# Patient Record
Sex: MALE | Race: BLACK OR AFRICAN AMERICAN | NOT HISPANIC OR LATINO | Employment: FULL TIME | URBAN - METROPOLITAN AREA
[De-identification: names, ages, dates, MRNs, and addresses within clinical notes are randomized per-mention and may not be internally consistent; named-entity substitution may affect disease eponyms.]

---

## 2022-01-06 ENCOUNTER — CLINICAL SUPPORT (OUTPATIENT)
Dept: HEMATOLOGY/ONCOLOGY | Facility: CLINIC | Age: 22
End: 2022-01-06

## 2022-01-06 DIAGNOSIS — J02.9 SORE THROAT: Primary | ICD-10-CM

## 2022-01-06 DIAGNOSIS — R09.81 NASAL CONGESTION: ICD-10-CM

## 2022-01-06 LAB
CTP QC/QA: YES
SARS-COV-2 AG RESP QL IA.RAPID: NEGATIVE

## 2022-01-06 PROCEDURE — 87811 SARS-COV-2 COVID19 W/OPTIC: CPT | Mod: S$GLB,,, | Performed by: INTERNAL MEDICINE

## 2022-01-06 PROCEDURE — 87811 SARS CORONAVIRUS 2 ANTIGEN POCT, MANUAL READ: ICD-10-PCS | Mod: S$GLB,,, | Performed by: INTERNAL MEDICINE

## 2022-10-25 ENCOUNTER — OFFICE VISIT (OUTPATIENT)
Dept: PRIMARY CARE CLINIC | Facility: CLINIC | Age: 22
End: 2022-10-25
Payer: OTHER GOVERNMENT

## 2022-10-25 VITALS
BODY MASS INDEX: 27.58 KG/M2 | HEART RATE: 66 BPM | SYSTOLIC BLOOD PRESSURE: 128 MMHG | HEIGHT: 68 IN | WEIGHT: 182 LBS | OXYGEN SATURATION: 98 % | DIASTOLIC BLOOD PRESSURE: 80 MMHG

## 2022-10-25 DIAGNOSIS — G47.00 INSOMNIA, UNSPECIFIED TYPE: Primary | ICD-10-CM

## 2022-10-25 PROCEDURE — 99203 OFFICE O/P NEW LOW 30 MIN: CPT | Mod: S$GLB,,, | Performed by: INTERNAL MEDICINE

## 2022-10-25 PROCEDURE — 99203 PR OFFICE/OUTPT VISIT, NEW, LEVL III, 30-44 MIN: ICD-10-PCS | Mod: S$GLB,,, | Performed by: INTERNAL MEDICINE

## 2022-10-25 RX ORDER — TRAZODONE HYDROCHLORIDE 300 MG/1
300 TABLET ORAL NIGHTLY
COMMUNITY
End: 2022-10-25 | Stop reason: SDUPTHER

## 2022-10-25 RX ORDER — TRAZODONE HYDROCHLORIDE 300 MG/1
300 TABLET ORAL NIGHTLY
Qty: 90 TABLET | Refills: 3 | Status: SHIPPED | OUTPATIENT
Start: 2022-10-25

## 2022-10-25 NOTE — PROGRESS NOTES
Subjective:      Patient ID: Woo Whitaker is a 22 y.o. male.    Chief Complaint: Establish Care    HPI      Past Medical History:   Diagnosis Date    Insomnia     PTSD (post-traumatic stress disorder)     Shoulder arthritis     left         History reviewed. No pertinent surgical history.     Family History   Problem Relation Age of Onset    Hyperlipidemia Mother     Stroke Mother     Hypertension Mother     No Known Problems Father         Social History     Socioeconomic History    Marital status: Single   Tobacco Use    Smoking status: Every Day     Types: Cigarettes, Vaping with nicotine     Passive exposure: Current    Smokeless tobacco: Never    Tobacco comments:     Vaping 6 mths   Substance and Sexual Activity    Alcohol use: Yes     Comment: every two days he drinks beer       Drinks about 2   16 oz beer, does get inebriated sometimes.     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   HENT:  Negative for hearing loss.    Eyes:  Negative for blurred vision.   Respiratory:  Negative for cough, shortness of breath and wheezing.    Cardiovascular:  Negative for chest pain, palpitations and leg swelling.   Gastrointestinal:  Negative for abdominal pain, constipation, diarrhea, nausea and vomiting.   Genitourinary:  Negative for dysuria, frequency and urgency.   Musculoskeletal:  Negative for falls.   Skin:  Negative for rash.   Neurological:  Negative for dizziness and headaches.   Psychiatric/Behavioral:  The patient has insomnia.    Objective:     Physical Exam  Vitals reviewed.   Constitutional:       Appearance: Normal appearance.   HENT:      Head: Normocephalic.   Eyes:      Extraocular Movements: Extraocular movements intact.      Conjunctiva/sclera: Conjunctivae normal.      Pupils: Pupils are equal, round, and reactive to  "light.   Cardiovascular:      Rate and Rhythm: Normal rate and regular rhythm.   Pulmonary:      Effort: Pulmonary effort is normal.      Breath sounds: Normal breath sounds.   Abdominal:      General: Bowel sounds are normal.   Musculoskeletal:         General: Normal range of motion.   Skin:     General: Skin is warm.      Capillary Refill: Capillary refill takes less than 2 seconds.   Neurological:      General: No focal deficit present.      Mental Status: He is alert and oriented to person, place, and time.   Psychiatric:         Mood and Affect: Mood normal.     /80 (BP Location: Left arm, Patient Position: Sitting, BP Method: Medium (Manual))   Pulse 66   Ht 5' 8" (1.727 m)   Wt 82.6 kg (182 lb)   SpO2 98%   BMI 27.67 kg/m²     Assessment:       ICD-10-CM ICD-9-CM   1. Insomnia, unspecified type  G47.00 780.52       Plan:     Medication List with Changes/Refills   Changed and/or Refilled Medications    Modified Medication Previous Medication    TRAZODONE (DESYREL) 300 MG TABLET traZODone (DESYREL) 300 MG tablet       Take 1 tablet (300 mg total) by mouth every evening. Took 1/2 tab at HS    Take 300 mg by mouth every evening. Took 1/2 tab at HS        Insomnia, unspecified type  -     traZODone (DESYREL) 300 MG tablet; Take 1 tablet (300 mg total) by mouth every evening. Took 1/2 tab at HS  Dispense: 90 tablet; Refill: 3     Assistance with smoking cessation was offered, including:  [x]  Medications  [x]  Counseling  []  Printed Information on Smoking Cessation  []  Referral to a Smoking Cessation Program    Patient was counseled regarding smoking for 3-10 minutes.       Patient does not smoke cigarettes but does vape which is not the best substitute               "

## 2022-12-08 ENCOUNTER — OFFICE VISIT (OUTPATIENT)
Dept: PRIMARY CARE CLINIC | Facility: CLINIC | Age: 22
End: 2022-12-08
Payer: OTHER GOVERNMENT

## 2022-12-08 ENCOUNTER — PATIENT MESSAGE (OUTPATIENT)
Dept: PRIMARY CARE CLINIC | Facility: CLINIC | Age: 22
End: 2022-12-08

## 2022-12-08 VITALS
BODY MASS INDEX: 27.43 KG/M2 | OXYGEN SATURATION: 98 % | DIASTOLIC BLOOD PRESSURE: 51 MMHG | SYSTOLIC BLOOD PRESSURE: 116 MMHG | WEIGHT: 181 LBS | HEIGHT: 68 IN | HEART RATE: 71 BPM

## 2022-12-08 DIAGNOSIS — R07.9 CHEST PAIN, UNSPECIFIED TYPE: Primary | ICD-10-CM

## 2022-12-08 PROCEDURE — 99214 PR OFFICE/OUTPT VISIT, EST, LEVL IV, 30-39 MIN: ICD-10-PCS | Mod: S$GLB,,, | Performed by: INTERNAL MEDICINE

## 2022-12-08 PROCEDURE — 99214 OFFICE O/P EST MOD 30 MIN: CPT | Mod: S$GLB,,, | Performed by: INTERNAL MEDICINE

## 2022-12-08 NOTE — PROGRESS NOTES
Subjective:      Patient ID: Woo Whitaker is a 22 y.o. male.    Chief Complaint: Chest Pain (Has been having it for a while and on last weekend, he was getting ready to throw a football he caught a sharp pain in the mid chest. He states it is normally locally on the left side under the nipple. He states it could be random actions that it occurs. He states that he has to hold his breath and breathe through the pain slowly. )      HPI    As above    Family History   Problem Relation Age of Onset    Hyperlipidemia Mother     Stroke Mother     Hypertension Mother     No Known Problems Father          Review of Systems   Constitutional:  Negative for chills, diaphoresis and fever.   Respiratory:  Negative for cough, shortness of breath and wheezing.    Cardiovascular:  Positive for chest pain. Negative for palpitations and leg swelling.   Gastrointestinal:  Negative for abdominal pain, constipation, diarrhea, nausea and vomiting.   Genitourinary:  Negative for dysuria, frequency and urgency.   Musculoskeletal:  Negative for falls and myalgias.   Skin:  Negative for rash.   Neurological:  Negative for dizziness and headaches.   Objective:     Physical Exam  Vitals reviewed.   Constitutional:       Appearance: Normal appearance.   HENT:      Head: Normocephalic.   Eyes:      Extraocular Movements: Extraocular movements intact.      Conjunctiva/sclera: Conjunctivae normal.      Pupils: Pupils are equal, round, and reactive to light.   Cardiovascular:      Rate and Rhythm: Normal rate and regular rhythm.   Pulmonary:      Effort: Pulmonary effort is normal.      Breath sounds: Normal breath sounds.   Abdominal:      General: Bowel sounds are normal.   Musculoskeletal:         General: Normal range of motion.   Skin:     General: Skin is warm.      Capillary Refill: Capillary refill takes less than 2 seconds.   Neurological:      General: No focal deficit present.      Mental Status: He is alert and oriented to person,  "place, and time.   Psychiatric:         Mood and Affect: Mood normal.     BP (!) 116/51 (BP Location: Right arm, Patient Position: Sitting, BP Method: Medium (Automatic))   Pulse 71   Ht 5' 8" (1.727 m)   Wt 82.1 kg (181 lb)   SpO2 98%   BMI 27.52 kg/m²     Assessment:       ICD-10-CM ICD-9-CM   1. Chest pain, unspecified type  R07.9 786.50       Plan:     Medication List with Changes/Refills   Current Medications    TRAZODONE (DESYREL) 300 MG TABLET    Take 1 tablet (300 mg total) by mouth every evening. Took 1/2 tab at HS        1. Chest pain, unspecified type  -     EKG 12-lead; Future  -     X-Ray Chest PA And Lateral; Future; Expected date: 12/08/2022       RTC PRN    Most likely precordial catch syndrome, advised to take small shallow breaths when experiencing pain, states pain is too short to take NSAIDs because by the time he thinks of taking one the pain is gone.                  "

## 2022-12-13 ENCOUNTER — PATIENT MESSAGE (OUTPATIENT)
Dept: PRIMARY CARE CLINIC | Facility: CLINIC | Age: 22
End: 2022-12-13
Payer: OTHER GOVERNMENT

## 2023-06-27 ENCOUNTER — PATIENT MESSAGE (OUTPATIENT)
Dept: RESEARCH | Facility: HOSPITAL | Age: 23
End: 2023-06-27
Payer: OTHER GOVERNMENT

## 2024-01-02 ENCOUNTER — OFFICE VISIT (OUTPATIENT)
Dept: PRIMARY CARE CLINIC | Facility: CLINIC | Age: 24
End: 2024-01-02
Payer: OTHER GOVERNMENT

## 2024-01-02 VITALS
SYSTOLIC BLOOD PRESSURE: 114 MMHG | OXYGEN SATURATION: 98 % | HEIGHT: 68 IN | BODY MASS INDEX: 29.4 KG/M2 | WEIGHT: 194 LBS | HEART RATE: 62 BPM | DIASTOLIC BLOOD PRESSURE: 70 MMHG

## 2024-01-02 DIAGNOSIS — M25.50 ARTHRALGIA, UNSPECIFIED JOINT: Primary | ICD-10-CM

## 2024-01-02 DIAGNOSIS — Z00.00 WELLNESS EXAMINATION: ICD-10-CM

## 2024-01-02 DIAGNOSIS — R07.2 PRECORDIAL CATCH SYNDROME: ICD-10-CM

## 2024-01-02 DIAGNOSIS — Z01.89 ROUTINE LAB DRAW: Primary | ICD-10-CM

## 2024-01-02 PROCEDURE — 36415 COLL VENOUS BLD VENIPUNCTURE: CPT | Mod: S$GLB,,, | Performed by: INTERNAL MEDICINE

## 2024-01-02 PROCEDURE — 99214 OFFICE O/P EST MOD 30 MIN: CPT | Mod: S$GLB,,, | Performed by: INTERNAL MEDICINE

## 2024-01-02 RX ORDER — IBUPROFEN 800 MG/1
800 TABLET ORAL EVERY 8 HOURS PRN
Qty: 60 TABLET | Refills: 0 | Status: SHIPPED | OUTPATIENT
Start: 2024-01-02 | End: 2024-02-01

## 2024-01-02 NOTE — PROGRESS NOTES
Answers submitted by the patient for this visit:  Review of Systems Questionnaire (Submitted on 1/2/2024)  activity change: No  unexpected weight change: No  neck pain: No  hearing loss: No  rhinorrhea: No  trouble swallowing: No  eye discharge: No  visual disturbance: No  chest tightness: Yes  wheezing: No  chest pain: Yes  palpitations: No  blood in stool: No  constipation: No  vomiting: No  diarrhea: No  polydipsia: No  polyuria: No  difficulty urinating: No  urgency: No  hematuria: No  joint swelling: No  arthralgias: Yes  headaches: No  weakness: No  confusion: No  dysphoric mood: No    Subjective:      Patient ID: Woo Whitaker is a 23 y.o. male.    Chief Complaint: Annual Exam and Joint Pain (The patient states he was previously on Ibuprofen 800 mg and would like a new script. He states at the time he came here he had refills and now without it. )    HPI    Past Medical History:   Diagnosis Date    Insomnia     PTSD (post-traumatic stress disorder)     Shoulder arthritis     left       Here for follow up, no acute complaints  1-2 beers three times a week, previous heavy drinker, quit vaping    Review of Systems   HENT:  Negative for hearing loss.    Eyes:  Negative for discharge.   Respiratory:  Negative for wheezing.    Cardiovascular:  Positive for chest pain. Negative for palpitations.        CXR and EKG negative  Does not radiate   Gastrointestinal:  Negative for blood in stool, constipation, diarrhea and vomiting.   Genitourinary:  Negative for hematuria and urgency.   Musculoskeletal:  Negative for neck pain.   Neurological:  Negative for weakness and headaches.   Endo/Heme/Allergies:  Negative for polydipsia.     Objective:     Physical Exam  Vitals reviewed.   Constitutional:       Appearance: Normal appearance.   HENT:      Head: Normocephalic.      Mouth/Throat:      Mouth: Mucous membranes are moist.      Pharynx: Oropharynx is clear.   Eyes:      Extraocular Movements: Extraocular movements  intact.      Conjunctiva/sclera: Conjunctivae normal.      Pupils: Pupils are equal, round, and reactive to light.   Cardiovascular:      Rate and Rhythm: Normal rate and regular rhythm.   Pulmonary:      Effort: Pulmonary effort is normal.      Breath sounds: Normal breath sounds.   Abdominal:      General: Bowel sounds are normal.   Musculoskeletal:      Right lower leg: No edema.      Left lower leg: No edema.   Skin:     General: Skin is warm.      Capillary Refill: Capillary refill takes less than 2 seconds.   Neurological:      Mental Status: He is alert and oriented to person, place, and time.   Psychiatric:         Mood and Affect: Mood normal.       Assessment:       ICD-10-CM ICD-9-CM   1. Arthralgia, unspecified joint  M25.50 719.40   2. Wellness examination  Z00.00 V70.0   3. Precordial catch syndrome  R07.2 786.50       Plan:     Medication List with Changes/Refills   New Medications    IBUPROFEN (ADVIL,MOTRIN) 800 MG TABLET    Take 1 tablet (800 mg total) by mouth every 8 (eight) hours as needed for Pain.   Current Medications    TRAZODONE (DESYREL) 300 MG TABLET    Take 1 tablet (300 mg total) by mouth every evening. Took 1/2 tab at HS        1. Arthralgia, unspecified joint  -     LORE[facility] Screen w/Reflex; Future; Expected date: 01/02/2024  -     ibuprofen (ADVIL,MOTRIN) 800 MG tablet; Take 1 tablet (800 mg total) by mouth every 8 (eight) hours as needed for Pain.  Dispense: 60 tablet; Refill: 0    2. Wellness examination  -     CBC Auto Differential; Future; Expected date: 01/02/2024  -     Comprehensive Metabolic Panel; Future; Expected date: 01/02/2024  -     TSH; Future; Expected date: 01/02/2024  -     Lipid Panel; Future; Expected date: 01/02/2024    3. Precordial catch syndrome         RTC in one year or sooner if needed

## 2024-01-03 LAB
ABS NRBC COUNT: 0 THOU/UL (ref 0–0.01)
ABSOLUTE BASOPHIL: 0 10*3/UL (ref 0–0.3)
ABSOLUTE EOSINOPHIL: 0.2 10*3/UL (ref 0–0.6)
ABSOLUTE IMMATURE GRAN: 0.01 THOU/UL (ref 0–0.03)
ABSOLUTE LYMPHOCYTE: 1.7 10*3/UL (ref 1.2–4)
ABSOLUTE MONOCYTE: 0.5 10*3/UL (ref 0.1–0.8)
ALBUMIN SERPL BCP-MCNC: 4.5 G/DL (ref 3.4–5)
ALP SERPL-CCNC: 50 U/L (ref 45–117)
ALT SERPL W P-5'-P-CCNC: 51 U/L (ref 16–61)
ANION GAP SERPL CALC-SCNC: 4 MMOL/L (ref 3–11)
AST SERPL-CCNC: 22 U/L (ref 15–37)
BASOPHILS NFR BLD: 0.5 % (ref 0–3)
BILIRUB SERPL-MCNC: 0.6 MG/DL (ref 0.2–1)
BUN SERPL-MCNC: 10 MG/DL (ref 7–18)
BUN/CREAT SERPL: 9.61 RATIO
CALCIUM SERPL-MCNC: 9.7 MG/DL (ref 8.5–10.1)
CHLORIDE SERPL-SCNC: 105 MMOL/L (ref 98–107)
CHOLEST SERPL-MSCNC: 218 MG/DL
CO2 SERPL-SCNC: 30 MMOL/L (ref 21–32)
CREAT SERPL-MCNC: 1.04 MG/DL (ref 0.7–1.3)
EOSINOPHIL NFR BLD: 6 % (ref 0–6)
ERYTHROCYTE [DISTWIDTH] IN BLOOD BY AUTOMATED COUNT: 11.9 % (ref 0–15.5)
GFR ESTIMATION: > 60
GLUCOSE SERPL-MCNC: 94 MG/DL (ref 74–106)
HCT VFR BLD AUTO: 45.1 % (ref 42–52)
HDLC SERPL-MCNC: 52 MG/DL
HGB BLD-MCNC: 14.7 G/DL (ref 14–18)
IMMATURE GRANULOCYTES: 0.2 % (ref 0–0.43)
LDLC SERPL CALC-MCNC: 145.8 MG/DL
LYMPHOCYTES NFR BLD: 42.3 % (ref 20–45)
MCH RBC QN AUTO: 30.9 PG (ref 27–32)
MCHC RBC AUTO-ENTMCNC: 32.6 % (ref 32–36)
MCV RBC AUTO: 94.7 FL (ref 80–97)
MONOCYTES NFR BLD: 11.4 % (ref 2–10)
NEUTROPHILS # BLD AUTO: 1.6 10*3/UL (ref 1.4–7)
NEUTROPHILS NFR BLD: 39.6 % (ref 50–80)
NUCLEATED RED BLOOD CELLS: 0 % (ref 0–0.2)
PLATELETS: 337 10*3/UL (ref 130–400)
PMV BLD AUTO: 9.5 FL (ref 9.2–12.2)
POTASSIUM SERPL-SCNC: 4.9 MMOL/L (ref 3.5–5.1)
PROT SERPL-MCNC: 7.7 G/DL (ref 6.4–8.2)
RBC # BLD AUTO: 4.76 10*6/UL (ref 4.7–6.1)
SODIUM BLD-SCNC: 139 MMOL/L (ref 131–143)
TRIGL SERPL-MCNC: 101 MG/DL (ref 0–149)
TSH SERPL DL<=0.005 MIU/L-ACNC: 0.74 UIU/ML (ref 0.36–3.74)
VLDL CHOLESTEROL: 20 MG/DL
WBC # BLD: 4 10*3/UL (ref 4.5–10)

## 2024-01-05 ENCOUNTER — PATIENT MESSAGE (OUTPATIENT)
Dept: PRIMARY CARE CLINIC | Facility: CLINIC | Age: 24
End: 2024-01-05
Payer: OTHER GOVERNMENT

## 2024-01-06 LAB — ANTINUCLEAR ANTIBODIES (ANA): NORMAL

## 2024-01-09 ENCOUNTER — PATIENT MESSAGE (OUTPATIENT)
Dept: PRIMARY CARE CLINIC | Facility: CLINIC | Age: 24
End: 2024-01-09
Payer: OTHER GOVERNMENT

## 2025-03-10 ENCOUNTER — OFFICE VISIT (OUTPATIENT)
Dept: PRIMARY CARE CLINIC | Facility: CLINIC | Age: 25
End: 2025-03-10
Payer: OTHER GOVERNMENT

## 2025-03-10 VITALS
HEART RATE: 60 BPM | OXYGEN SATURATION: 96 % | HEIGHT: 68 IN | SYSTOLIC BLOOD PRESSURE: 115 MMHG | BODY MASS INDEX: 29.52 KG/M2 | WEIGHT: 194.81 LBS | DIASTOLIC BLOOD PRESSURE: 62 MMHG

## 2025-03-10 DIAGNOSIS — R07.9 CHEST PAIN, UNSPECIFIED TYPE: Primary | ICD-10-CM

## 2025-03-10 DIAGNOSIS — M25.50 ARTHRALGIA, UNSPECIFIED JOINT: ICD-10-CM

## 2025-03-10 DIAGNOSIS — F43.21 GRIEF: ICD-10-CM

## 2025-03-10 PROCEDURE — 99214 OFFICE O/P EST MOD 30 MIN: CPT | Mod: S$PBB,,, | Performed by: INTERNAL MEDICINE

## 2025-03-10 RX ORDER — IBUPROFEN 800 MG/1
800 TABLET ORAL 3 TIMES DAILY
COMMUNITY
End: 2025-03-10 | Stop reason: SDUPTHER

## 2025-03-10 RX ORDER — PREDNISONE 20 MG/1
20 TABLET ORAL 2 TIMES DAILY
COMMUNITY
Start: 2025-01-30 | End: 2025-03-10

## 2025-03-10 RX ORDER — PROMETHAZINE HYDROCHLORIDE AND DEXTROMETHORPHAN HYDROBROMIDE 6.25; 15 MG/5ML; MG/5ML
SYRUP ORAL
COMMUNITY
Start: 2025-01-30 | End: 2025-03-10

## 2025-03-10 RX ORDER — IBUPROFEN 800 MG/1
800 TABLET ORAL 3 TIMES DAILY PRN
Qty: 90 TABLET | Refills: 0 | Status: SHIPPED | OUTPATIENT
Start: 2025-03-10

## 2025-03-10 NOTE — PROGRESS NOTES
Subjective:      Patient ID: Woo Whitaker is a 24 y.o. male.    Chief Complaint: Annual Exam    HPI    Past Medical History:   Diagnosis Date    Insomnia     PTSD (post-traumatic stress disorder)     Shoulder arthritis     left       Patient with above medical problems here for follow up    Alcohol drinking only once a month, no binge drinking     Chest pains are still occurring 2-3 times a month. EKG and CXR done negative    Also reports some below the patella knee pains. I advised rest ice, NSAIDs etc and he states he can get a light duty request    Reports some life events that have caused him some grief and would like to se a counselor/therapist     Review of Systems   Constitutional:  Negative for chills and fever.   HENT:  Negative for hearing loss.    Eyes:  Negative for blurred vision.   Respiratory:  Negative for cough, shortness of breath and wheezing.    Cardiovascular:  Negative for chest pain, palpitations and leg swelling.   Gastrointestinal:  Negative for abdominal pain, blood in stool, constipation, diarrhea, melena, nausea and vomiting.   Genitourinary:  Negative for dysuria, frequency and urgency.   Musculoskeletal:  Negative for falls.   Skin:  Negative for rash.   Neurological:  Negative for dizziness and headaches.   Endo/Heme/Allergies:  Does not bruise/bleed easily.   Psychiatric/Behavioral:  Positive for depression. The patient has insomnia. The patient is not nervous/anxious.      Objective:     Physical Exam  Vitals reviewed.   Constitutional:       Appearance: Normal appearance.   HENT:      Head: Normocephalic.      Mouth/Throat:      Mouth: Mucous membranes are moist.      Pharynx: Oropharynx is clear.   Eyes:      Extraocular Movements: Extraocular movements intact.      Conjunctiva/sclera: Conjunctivae normal.      Pupils: Pupils are equal, round, and reactive to light.   Cardiovascular:      Rate and Rhythm: Normal rate and regular rhythm.   Pulmonary:      Effort: Pulmonary effort  "is normal.      Breath sounds: Normal breath sounds.   Abdominal:      General: Bowel sounds are normal.   Musculoskeletal:      Right lower leg: No edema.      Left lower leg: No edema.   Skin:     General: Skin is warm.      Capillary Refill: Capillary refill takes less than 2 seconds.   Neurological:      Mental Status: He is alert and oriented to person, place, and time.   Psychiatric:         Mood and Affect: Mood normal.       /62 (BP Location: Right arm, Patient Position: Sitting)   Pulse 60   Ht 5' 8" (1.727 m)   Wt 88.4 kg (194 lb 12.8 oz)   SpO2 96%   BMI 29.62 kg/m²     Assessment:       ICD-10-CM ICD-9-CM   1. Chest pain, unspecified type  R07.9 786.50   2. Arthralgia, unspecified joint  M25.50 719.40   3. Grief  F43.21 309.0       Plan:     Medication List with Changes/Refills   Current Medications    TRAZODONE (DESYREL) 300 MG TABLET    Take 1 tablet (300 mg total) by mouth every evening. Took 1/2 tab at HS   Changed and/or Refilled Medications    Modified Medication Previous Medication    IBUPROFEN (ADVIL,MOTRIN) 800 MG TABLET ibuprofen (ADVIL,MOTRIN) 800 MG tablet       Take 1 tablet (800 mg total) by mouth 3 (three) times daily as needed for Pain.    Take 800 mg by mouth 3 (three) times daily.   Discontinued Medications    PREDNISONE (DELTASONE) 20 MG TABLET    Take 20 mg by mouth 2 (two) times daily.    PROMETHAZINE-DEXTROMETHORPHAN (PROMETHAZINE-DM) 6.25-15 MG/5 ML SYRP    TAKE 7.5ML BY MOUTH EVERY 6 HOURS AS NEEDED FOR COUGH        1. Chest pain, unspecified type  -     Echo; Future    2. Arthralgia, unspecified joint  -     ibuprofen (ADVIL,MOTRIN) 800 MG tablet; Take 1 tablet (800 mg total) by mouth 3 (three) times daily as needed for Pain.  Dispense: 90 tablet; Refill: 0    3. Grief  -     Ambulatory referral/consult to Psychiatry; Future; Expected date: 03/17/2025         Patient denies SI/HI    Patient has trazodone for insomnia but I sent that 3 yrs ago, advised to make sure he " has refills and not  and if needed I can send a refill

## 2025-03-14 ENCOUNTER — PATIENT MESSAGE (OUTPATIENT)
Dept: PRIMARY CARE CLINIC | Facility: CLINIC | Age: 25
End: 2025-03-14
Payer: OTHER GOVERNMENT

## 2025-03-17 ENCOUNTER — PATIENT MESSAGE (OUTPATIENT)
Dept: PRIMARY CARE CLINIC | Facility: CLINIC | Age: 25
End: 2025-03-17
Payer: OTHER GOVERNMENT

## 2025-03-24 ENCOUNTER — TELEPHONE (OUTPATIENT)
Dept: PRIMARY CARE CLINIC | Facility: CLINIC | Age: 25
End: 2025-03-24
Payer: OTHER GOVERNMENT

## 2025-03-25 ENCOUNTER — PATIENT MESSAGE (OUTPATIENT)
Dept: PRIMARY CARE CLINIC | Facility: CLINIC | Age: 25
End: 2025-03-25
Payer: OTHER GOVERNMENT

## 2025-08-28 DIAGNOSIS — M25.50 ARTHRALGIA, UNSPECIFIED JOINT: ICD-10-CM

## 2025-08-28 RX ORDER — IBUPROFEN 800 MG/1
800 TABLET, FILM COATED ORAL 3 TIMES DAILY PRN
Qty: 90 TABLET | Refills: 0 | Status: SHIPPED | OUTPATIENT
Start: 2025-08-28